# Patient Record
(demographics unavailable — no encounter records)

---

## 2025-02-20 NOTE — REASON FOR VISIT
[FreeTextEntry1] : The patient feels well from a cardiac standpoint His BP has been running high the last day or so His lipids are reportedly borderline His ECG is NSR with a possible old IWMI His last cardiac evaluation was 2020

## 2025-02-20 NOTE — ASSESSMENT
[FreeTextEntry1] : An echocardiogram and stress test were ordered for further evaluation of his abnormal ECG A calcium score was ordered for better risk stratification He was asked to forward a copy of his labs He will start checking his BP more regularly

## 2025-04-22 NOTE — ADDENDUM
[FreeTextEntry1] : This note was partly authored by Steve Chambers working as a scribe for CAROLINA Wallace. I, CAROLINA Wallace, have reviewed the content of this note and confirm it is true and accurate. I personally performed the history and physical examination and made all the decisions. 04/18/2025.

## 2025-04-22 NOTE — PHYSICAL EXAM
[Normal Appearance] : normal appearance [General Appearance - In No Acute Distress] : no acute distress [] : no respiratory distress [Skin Color & Pigmentation] : normal skin color and pigmentation [Oriented To Time, Place, And Person] : oriented to person, place, and time [Affect] : the affect was normal [Mood] : the mood was normal [de-identified] : PVR 19 cc

## 2025-04-22 NOTE — ASSESSMENT
[FreeTextEntry1] : Nocturia. No urinary bother. Waking not for need to void. No interventions needed --IPSS today 11 --PVR 19 cc  PSA reportedly normal with PCP Results will be obtained,   If PSA is normal, patient may continue to follow up with PCP and come back to urology if he has any urinary bother or his PSA has risen.   He expresses understanding.

## 2025-04-22 NOTE — HISTORY OF PRESENT ILLNESS
[FreeTextEntry1] : 04/18/2025: Mr. LEARY is a 61-year-old male with h/o Low T (dx'ed at160) managed with Endo, and ED  former patient of Dr. Eason, meeting her for the first time.   At baseline, no urinary bother.  stream is good and steady,  no straining/pushing to void.  No feeling of incomplete voiding.  No urgency --IPSS today 11 --PVR 19 cc  He voids every 3-4hrs during the daytime.  Reports Nocturia x1; but admits he does not get up because he needs to void; he wakes and then goes. No h/o GH, renal stones, or AUR  ED manages with Sildenafil citrate 50 mg, prescribed and managed by Endo. Reports satisfaction,   Reports his PSA with PCP was normal  No family hx of  malignancy.

## 2025-04-22 NOTE — PHYSICAL EXAM
[Normal Appearance] : normal appearance [General Appearance - In No Acute Distress] : no acute distress [] : no respiratory distress [Skin Color & Pigmentation] : normal skin color and pigmentation [Oriented To Time, Place, And Person] : oriented to person, place, and time [Affect] : the affect was normal [Mood] : the mood was normal [de-identified] : PVR 19 cc